# Patient Record
Sex: FEMALE | Race: WHITE | Employment: FULL TIME | URBAN - METROPOLITAN AREA
[De-identification: names, ages, dates, MRNs, and addresses within clinical notes are randomized per-mention and may not be internally consistent; named-entity substitution may affect disease eponyms.]

---

## 2017-03-22 ENCOUNTER — LABORATORY ENCOUNTER (OUTPATIENT)
Dept: LAB | Age: 52
End: 2017-03-22
Attending: INTERNAL MEDICINE
Payer: COMMERCIAL

## 2017-03-22 DIAGNOSIS — Z76.89 ENCOUNTER FOR BIOPSY: Primary | ICD-10-CM

## 2017-03-22 PROCEDURE — 88305 TISSUE EXAM BY PATHOLOGIST: CPT

## 2017-04-03 NOTE — TELEPHONE ENCOUNTER
Pharmacy takes Rx as ordered they have start dates on them. They don't make mistakes. Patient should be out of Rx anyway. Amphetamine-Dextroamphet ER (ADDERALL XR) 20 MG Oral Capsule SR 24 Hr 60 capsule 0 1/7/2017       LOV 11/16 Due in February.   Wait

## 2017-04-03 NOTE — TELEPHONE ENCOUNTER
Patient filled the generic Adderall out of order and knows they will not take the paper script she has that has . Can she get another one? She has an appointment for refills this week.     \Future Appointments  Date Time Provider Oswaldo Martínez

## 2017-04-04 NOTE — TELEPHONE ENCOUNTER
Spoke to patient she wanted this  Rx filled now. Advised that  PCP wanted her to f/u and get refill at her appointment. Insisted she need Rx before appointment.   Advised PCP again would fill at her appointment as PCP says Rx  not critical. Advised t

## 2017-04-07 NOTE — PROGRESS NOTES
Oswald Woodson IS A 46year old female [address] [facility] Patient presents with:  Medication Request: Addrall       History of present illness:     No side effects on Adderall, no change in weight, headache, palpitations. Takes it bid on work days.  Takes 1 pill on Amphetamine-Dextroamphet ER (ADDERALL XR) 20 MG Oral Capsule SR 24 Hr Take 2 capsules (40 mg total) by mouth daily.  Disp: 60 capsule Rfl: 0       Allergy:      No Known Allergies    Family history:       Family History   Problem Relation Age of Onset   • O request.    Diagnoses and all orders for this visit:    Attention deficit disorder (ADD) without hyperactivity    Screening mammogram, encounter for  -     [facility] DIGITAL MANIN TAE W/CAD (CPT=/68060);  Future    Family history of breast cancer  -     Gen

## 2017-10-30 ENCOUNTER — OFFICE VISIT (OUTPATIENT)
Dept: FAMILY MEDICINE CLINIC | Facility: CLINIC | Age: 52
End: 2017-10-30

## 2017-10-30 VITALS
WEIGHT: 159 LBS | SYSTOLIC BLOOD PRESSURE: 126 MMHG | HEIGHT: 65 IN | RESPIRATION RATE: 20 BRPM | OXYGEN SATURATION: 98 % | DIASTOLIC BLOOD PRESSURE: 84 MMHG | TEMPERATURE: 98 F | BODY MASS INDEX: 26.49 KG/M2 | HEART RATE: 76 BPM

## 2017-10-30 DIAGNOSIS — Z12.31 ENCOUNTER FOR SCREENING MAMMOGRAM FOR BREAST CANCER: Primary | ICD-10-CM

## 2017-10-30 DIAGNOSIS — F98.8 ATTENTION DEFICIT DISORDER OF ADULT: ICD-10-CM

## 2017-10-30 DIAGNOSIS — Z80.3 FAMILY HISTORY OF BREAST CANCER: ICD-10-CM

## 2017-10-30 PROCEDURE — 99213 OFFICE O/P EST LOW 20 MIN: CPT | Performed by: FAMILY MEDICINE

## 2017-10-30 RX ORDER — DEXTROAMPHETAMINE SACCHARATE, AMPHETAMINE ASPARTATE MONOHYDRATE, DEXTROAMPHETAMINE SULFATE AND AMPHETAMINE SULFATE 5; 5; 5; 5 MG/1; MG/1; MG/1; MG/1
20 CAPSULE, EXTENDED RELEASE ORAL 2 TIMES DAILY
Qty: 60 CAPSULE | Refills: 0 | Status: SHIPPED | OUTPATIENT
Start: 2017-10-30 | End: 2017-11-29

## 2017-10-30 RX ORDER — DEXTROAMPHETAMINE SACCHARATE, AMPHETAMINE ASPARTATE MONOHYDRATE, DEXTROAMPHETAMINE SULFATE AND AMPHETAMINE SULFATE 5; 5; 5; 5 MG/1; MG/1; MG/1; MG/1
20 CAPSULE, EXTENDED RELEASE ORAL 2 TIMES DAILY
Qty: 60 CAPSULE | Refills: 0 | Status: SHIPPED | OUTPATIENT
Start: 2017-12-30 | End: 2018-08-16

## 2017-10-30 RX ORDER — DEXTROAMPHETAMINE SACCHARATE, AMPHETAMINE ASPARTATE MONOHYDRATE, DEXTROAMPHETAMINE SULFATE AND AMPHETAMINE SULFATE 5; 5; 5; 5 MG/1; MG/1; MG/1; MG/1
20 CAPSULE, EXTENDED RELEASE ORAL 2 TIMES DAILY
Qty: 60 CAPSULE | Refills: 0 | Status: SHIPPED | OUTPATIENT
Start: 2017-11-30 | End: 2017-12-30

## 2017-10-30 NOTE — PATIENT INSTRUCTIONS
Continue Adderall twice a day, need to schedule recheck appointment in 3 months to reevaluate. Also do physical for non-gyne reasons at that time. Mammogram ordered. Copy to Dr. Fabian Cm.

## 2017-10-30 NOTE — PROGRESS NOTES
Cristofer Sy IS A 46year old female [facility] Patient presents with:  ADHD: refill med       History of present illness:     Pt feels her quality of life is better with than without medicine, took it bid then went off it and tried to watch lifestyle/lif tobacco: Never Used    Comment: quit    Alcohol use Yes  0.0 oz/week     Comment: OCASSIONALLY    Drug use: No    Sexual activity: Not on file     Other Topics Concern    Blood Transfusions No    Caffeine Concern Yes    Comment: coffee daily    Occupationa day, need to schedule recheck appointment in 3 months to reevaluate. Also do physical for non-gyne reasons at that time. Mammogram ordered. Copy to Dr. Saman Frost.

## 2017-11-01 ENCOUNTER — HOSPITAL ENCOUNTER (OUTPATIENT)
Dept: MAMMOGRAPHY | Age: 52
Discharge: HOME | End: 2017-11-01
Attending: FAMILY MEDICINE
Payer: COMMERCIAL

## 2017-11-01 DIAGNOSIS — Z80.3 FAMILY HISTORY OF BREAST CANCER: ICD-10-CM

## 2017-11-01 DIAGNOSIS — Z12.31 ENCOUNTER FOR SCREENING MAMMOGRAM FOR BREAST CANCER: ICD-10-CM

## 2017-11-01 PROCEDURE — 77067 SCR MAMMO BI INCL CAD: CPT | Performed by: FAMILY MEDICINE

## 2017-11-29 ENCOUNTER — OFFICE VISIT (OUTPATIENT)
Dept: OBGYN CLINIC | Facility: CLINIC | Age: 52
End: 2017-11-29

## 2017-11-29 VITALS
HEIGHT: 64 IN | SYSTOLIC BLOOD PRESSURE: 126 MMHG | BODY MASS INDEX: 26.12 KG/M2 | WEIGHT: 153 LBS | DIASTOLIC BLOOD PRESSURE: 80 MMHG

## 2017-11-29 DIAGNOSIS — Z01.419 WELL WOMAN EXAM WITH ROUTINE GYNECOLOGICAL EXAM: Primary | ICD-10-CM

## 2017-11-29 DIAGNOSIS — Z80.41 FAMILY HISTORY OF OVARIAN CANCER: ICD-10-CM

## 2017-11-29 DIAGNOSIS — Z80.0 FAMILY HISTORY OF COLON CANCER: ICD-10-CM

## 2017-11-29 DIAGNOSIS — Z12.39 BREAST CANCER SCREENING: ICD-10-CM

## 2017-11-29 DIAGNOSIS — Z12.4 CERVICAL CANCER SCREENING: ICD-10-CM

## 2017-11-29 DIAGNOSIS — Z80.3 FAMILY HISTORY OF BREAST CANCER IN SISTER: ICD-10-CM

## 2017-11-29 PROCEDURE — 99386 PREV VISIT NEW AGE 40-64: CPT | Performed by: NURSE PRACTITIONER

## 2017-11-29 PROCEDURE — 88175 CYTOPATH C/V AUTO FLUID REDO: CPT | Performed by: NURSE PRACTITIONER

## 2017-11-29 PROCEDURE — 87624 HPV HI-RISK TYP POOLED RSLT: CPT | Performed by: NURSE PRACTITIONER

## 2017-11-29 NOTE — PROGRESS NOTES
Here for new gynecology visit. 46year old G 0 P 0. No LMP recorded. Patient is not currently having periods (Reason: Menopause). .     Here for Annual Gynecologic Exam.     Patient is menopausal.    Last pap smear 11/2015 and it was normal.  No hx of abn with diarrhea or constipation. :   No urgency, frequency, NIKOLAI, bladder problems in past.  Extremities:  No pain, swelling, arthralgias, joint swelling. Neurological:  No headaches, depression, anxiety, migraines, seizure disorders, behavioral problems.

## 2017-12-12 ENCOUNTER — TELEPHONE (OUTPATIENT)
Dept: FAMILY MEDICINE CLINIC | Facility: CLINIC | Age: 52
End: 2017-12-12

## 2017-12-12 DIAGNOSIS — F98.8 ATTENTION DEFICIT DISORDER OF ADULT: ICD-10-CM

## 2017-12-12 RX ORDER — DEXTROAMPHETAMINE SACCHARATE, AMPHETAMINE ASPARTATE MONOHYDRATE, DEXTROAMPHETAMINE SULFATE AND AMPHETAMINE SULFATE 5; 5; 5; 5 MG/1; MG/1; MG/1; MG/1
20 CAPSULE, EXTENDED RELEASE ORAL 2 TIMES DAILY
Qty: 60 CAPSULE | Refills: 0 | Status: CANCELLED | OUTPATIENT
Start: 2017-12-12 | End: 2018-01-11

## 2017-12-12 NOTE — TELEPHONE ENCOUNTER
Prior auth done wait for approval or denial.    .Dispensed  D-AMPHETAMINE SALT COM ER(XR)20MG C 11/01/2017 10/30/2017   60 0 30 DILAN MASON

## 2017-12-14 NOTE — TELEPHONE ENCOUNTER
Patient says her insurance name is Jae Sanchez that is what she uses for insurance. Sent new information to insurance.

## 2018-08-16 NOTE — PATIENT INSTRUCTIONS
Alternative atomoxetine (strattera), bupropion (Wellbutrin, usually for depression but used for ADD) or less often clonidine are used for ADD. Recommend consult with Dr. Kevin Rader to determine what your best strategy for ADD is.      Will give 1 month of

## 2018-08-16 NOTE — PROGRESS NOTES
[address] IS A 48year old female HERE FOR Patient presents with:  Medication Follow-Up: Adderall refill        History of present illness:     Hasn't taken any meds since February.  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 Review of systems:     No heart symptoms, no suicidality when on medicine. Again pt states she does function and focus better when on medicines.      Exam: Affect appropriate but appears dissatisfied when I offered Strattera as an option, my other offer, which I implemented today, was an rx for Adderall 20 mg bid for just 1 month and then consult with behavioral health Dr. Desire Alcantar group for comanagement.

## 2018-08-24 ENCOUNTER — OFFICE VISIT (OUTPATIENT)
Dept: FAMILY MEDICINE CLINIC | Facility: CLINIC | Age: 53
End: 2018-08-24

## 2018-08-24 VITALS
SYSTOLIC BLOOD PRESSURE: 114 MMHG | DIASTOLIC BLOOD PRESSURE: 74 MMHG | TEMPERATURE: 98 F | BODY MASS INDEX: 27.63 KG/M2 | HEART RATE: 64 BPM | WEIGHT: 161.81 LBS | RESPIRATION RATE: 16 BRPM | HEIGHT: 64 IN
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
## 2018-08-24 PROCEDURE — 99212 OFFICE O/P EST SF 10 MIN: CPT | Performed by: FAMILY MEDICINE

## 2018-08-24 NOTE — PROGRESS NOTES
[address] IS A 48year old female [address] [place] [address] Patient presents with:  Bite: Pt. believes she was bit by a bug when she was walking through the [place] yesterday.  Pt. states its painful to the touch and is bruised       History of present illness: on, elliptical    Seat Belt Yes     Social History Narrative    Has 3 cats. Review of systems:     Had some random aches in back this AM, later right axilla area, went away. and had minor itching at the site. Was wearing pants.      Exam:     /

## 2018-08-24 NOTE — PATIENT INSTRUCTIONS
Ice, elevate leg, rest (avoid long walks until Sunday) tylenol (avoid ibuprofen or Aleve for 2 days or so). Return for expanding red raised skin lesion, fever, flu like symptoms with aches.

## 2020-01-21 ENCOUNTER — TELEPHONE (OUTPATIENT)
Dept: FAMILY MEDICINE CLINIC | Facility: CLINIC | Age: 55
End: 2020-01-21

## 2020-01-22 ENCOUNTER — TELEPHONE (OUTPATIENT)
Dept: FAMILY MEDICINE CLINIC | Facility: CLINIC | Age: 55
End: 2020-01-22

## 2020-02-05 ENCOUNTER — OFFICE VISIT (OUTPATIENT)
Dept: FAMILY MEDICINE CLINIC | Facility: CLINIC | Age: 55
End: 2020-02-05

## 2020-02-05 ENCOUNTER — HOSPITAL ENCOUNTER (OUTPATIENT)
Dept: MAMMOGRAPHY | Age: 55
Discharge: HOME | End: 2020-02-05
Attending: FAMILY MEDICINE
Payer: COMMERCIAL

## 2020-02-05 VITALS
DIASTOLIC BLOOD PRESSURE: 80 MMHG | HEART RATE: 66 BPM | SYSTOLIC BLOOD PRESSURE: 110 MMHG | OXYGEN SATURATION: 98 % | RESPIRATION RATE: 16 BRPM | HEIGHT: 64 IN | TEMPERATURE: 98 F | BODY MASS INDEX: 29.19 KG/M2 | WEIGHT: 171 LBS

## 2020-02-05 DIAGNOSIS — Z00.00 WELL ADULT EXAM: ICD-10-CM

## 2020-02-05 DIAGNOSIS — Z13.228 SCREENING FOR ENDOCRINE, NUTRITIONAL, METABOLIC AND IMMUNITY DISORDER: ICD-10-CM

## 2020-02-05 DIAGNOSIS — Z80.41 FAMILY HISTORY OF OVARIAN CANCER: ICD-10-CM

## 2020-02-05 DIAGNOSIS — Z86.010 PERSONAL HISTORY OF COLONIC POLYPS: ICD-10-CM

## 2020-02-05 DIAGNOSIS — Z12.31 VISIT FOR SCREENING MAMMOGRAM: ICD-10-CM

## 2020-02-05 DIAGNOSIS — Z80.3 FAMILY HISTORY OF BREAST CANCER: ICD-10-CM

## 2020-02-05 DIAGNOSIS — Z13.21 SCREENING FOR ENDOCRINE, NUTRITIONAL, METABOLIC AND IMMUNITY DISORDER: ICD-10-CM

## 2020-02-05 DIAGNOSIS — Z78.0 POSTMENOPAUSAL: Primary | ICD-10-CM

## 2020-02-05 DIAGNOSIS — Z13.29 SCREENING FOR ENDOCRINE, NUTRITIONAL, METABOLIC AND IMMUNITY DISORDER: ICD-10-CM

## 2020-02-05 DIAGNOSIS — Z13.29 SCREENING FOR THYROID DISORDER: ICD-10-CM

## 2020-02-05 DIAGNOSIS — Z13.1 SCREENING FOR DIABETES MELLITUS: ICD-10-CM

## 2020-02-05 DIAGNOSIS — Z13.0 SCREENING FOR DEFICIENCY ANEMIA: ICD-10-CM

## 2020-02-05 DIAGNOSIS — Z13.0 SCREENING FOR ENDOCRINE, NUTRITIONAL, METABOLIC AND IMMUNITY DISORDER: ICD-10-CM

## 2020-02-05 DIAGNOSIS — Z13.220 SCREENING, LIPID: ICD-10-CM

## 2020-02-05 DIAGNOSIS — Z11.59 ENCOUNTER FOR HCV SCREENING TEST FOR LOW RISK PATIENT: ICD-10-CM

## 2020-02-05 PROCEDURE — 77063 BREAST TOMOSYNTHESIS BI: CPT | Performed by: FAMILY MEDICINE

## 2020-02-05 PROCEDURE — 99396 PREV VISIT EST AGE 40-64: CPT | Performed by: FAMILY MEDICINE

## 2020-02-05 PROCEDURE — 90472 IMMUNIZATION ADMIN EACH ADD: CPT | Performed by: FAMILY MEDICINE

## 2020-02-05 PROCEDURE — 90750 HZV VACC RECOMBINANT IM: CPT | Performed by: FAMILY MEDICINE

## 2020-02-05 PROCEDURE — 90714 TD VACC NO PRESV 7 YRS+ IM: CPT | Performed by: FAMILY MEDICINE

## 2020-02-05 PROCEDURE — 90471 IMMUNIZATION ADMIN: CPT | Performed by: FAMILY MEDICINE

## 2020-02-05 PROCEDURE — 77067 SCR MAMMO BI INCL CAD: CPT | Performed by: FAMILY MEDICINE

## 2020-02-05 NOTE — PROGRESS NOTES
Chris Graff is a 47year old female here for Patient presents with: Well Adult: Denied flu shot. HPI:   Patient complains of here for well exam.       Sees gyne, LMP 5 years ago. Sister had age 44 breast ca, mother ovarian ca in 35s. on file        Gets together: Not on file        Attends [employer] service: Not on file        Active member of club or organization: Not on file        Attends meetings of clubs or organizations: Not on file        Relationship status: Not on file      In or discharge, no axillary adenopathy. LUNGS: clear to auscultation. CARDIO: Regular rate and rhythm without murmur S3 or S4.  GI: no distention, masses, organomegaly or tenderness.   : defer  MUSCULOSKELETAL: Back and extremities within normal limits Case: [id number]                                  Authorizing Provider:  WINTER Calixto     Collected:           11/29/2017 02:49 PM          Ordering Location:     [place] [address],      Received:            11/29/2017 09:10 PM notify of results    Patient Instructions     Check with sister if she had genetic testing for breast and ovarian cancer risk. Td since last tetanus was 12 years.  Over 50, recommend Shingrix, shingles vaccine 2 doses 2 to 6 months apart, 98% effective find possible disorders or diseases in people who don't have any symptoms.  The goal is to find a disease early so lifestyle changes can be made and you can be watched more closely to reduce the risk of disease, or to detect it early enough to treat it most double-contrast barium enema every 5 years; yearly fecal occult blood test or fecal immunochemical test; or a stool DNA test as often as your health care provider advises; talk with your health care provider about which tests are best for you   Depression 3 doses   Influenza (flu) All women in this age group Once a year   Measles, mumps, rubella (MMR) Women in this age group through their late 46s who have no record of these infections or vaccines 1 dose   Meningococcal Women at increased risk for infection instructions. Recommend return for annual adult well exam in 1 year.     Td     Kelly

## 2020-02-05 NOTE — PATIENT INSTRUCTIONS
Check with sister if she had genetic testing for breast and ovarian cancer risk. Td since last tetanus was 12 years.  Over 50, recommend Shingrix, shingles vaccine 2 doses 2 to 6 months apart, 98% effective but there is about 16% risk of arm swelling an don't have any symptoms. The goal is to find a disease early so lifestyle changes can be made and you can be watched more closely to reduce the risk of disease, or to detect it early enough to treat it most effectively.  Screening tests are not considered d fecal occult blood test or fecal immunochemical test; or a stool DNA test as often as your health care provider advises; talk with your health care provider about which tests are best for you   Depression All women in this age group At routine exams   [facility] group Once a year   Measles, mumps, rubella (MMR) Women in this age group through their late 46s who have no record of these infections or vaccines 1 dose   Meningococcal Women at increased risk for infection – talk with your healthcare provider 1 or more adult well exam in 1 year.     Td     Shingrix

## 2021-02-23 ENCOUNTER — TELEPHONE (OUTPATIENT)
Dept: FAMILY MEDICINE CLINIC | Facility: CLINIC | Age: 56
End: 2021-02-23

## 2021-02-23 DIAGNOSIS — Z12.31 ENCOUNTER FOR SCREENING MAMMOGRAM FOR BREAST CANCER: ICD-10-CM

## 2021-02-23 DIAGNOSIS — Z20.822 CLOSE EXPOSURE TO COVID-19 VIRUS: Primary | ICD-10-CM

## 2021-02-23 NOTE — TELEPHONE ENCOUNTER
Called patient and reviewed exposure. Was at the hair salon for 2 hrs. States she was wearing her mask except when drinking coffee. She is unsure if all other employees and clients were wearing masks properly. Denies any symptoms.  Advised since exposure

## 2021-02-23 NOTE — TELEPHONE ENCOUNTER
Patient stated that yesterday she was at the salon getting hair done. Today her stylist called her stating the entire salon has tested positive for covid. Patient stated that everyone was wearing masks. Patient would like to know protocol for her.

## 2021-03-01 ENCOUNTER — LAB ENCOUNTER (OUTPATIENT)
Dept: LAB | Age: 56
End: 2021-03-01
Attending: FAMILY MEDICINE
Payer: COMMERCIAL

## 2021-03-01 DIAGNOSIS — Z20.822 CLOSE EXPOSURE TO COVID-19 VIRUS: ICD-10-CM

## 2021-03-03 LAB — SARS-COV-2 RNA RESP QL NAA+PROBE: NOT DETECTED

## 2021-03-03 NOTE — TELEPHONE ENCOUNTER
Patient scheduled an appt on [facility] for a follow up visit to get a RTW letter. Had covid exposure  2/22/21 and negative covid test 3/1/21. Self isolation could end in no ongoing symptoms on 3/8/21.   ML for patient to check to see if she developed any

## 2021-03-08 ENCOUNTER — TELEMEDICINE (OUTPATIENT)
Dept: FAMILY MEDICINE CLINIC | Facility: CLINIC | Age: 56
End: 2021-03-08

## 2021-03-08 VITALS — WEIGHT: 171 LBS | BODY MASS INDEX: 29 KG/M2

## 2021-03-08 DIAGNOSIS — R51.9 ACUTE NONINTRACTABLE HEADACHE, UNSPECIFIED HEADACHE TYPE: ICD-10-CM

## 2021-03-08 DIAGNOSIS — R53.83 OTHER FATIGUE: Primary | ICD-10-CM

## 2021-03-08 PROBLEM — Z20.822 CLOSE EXPOSURE TO COVID-19 VIRUS: Status: ACTIVE | Noted: 2021-03-08

## 2021-03-08 PROCEDURE — 99213 OFFICE O/P EST LOW 20 MIN: CPT | Performed by: FAMILY MEDICINE

## 2021-03-08 NOTE — PROGRESS NOTES
[address] A 54year old female evaluated via telehealth visit FOR Patient presents with:  Nausea/vomiting  Fatigue  Covid: F/U.    due to current national emergency with SARS-CoV-2 pandemic.      History of present illness:   2-way communic feel not well. no fever. No taste or smell problems. Occasional body aches, not as severe as with flu. Seun Drake Appetite was normal until today. Talked with HR and was told to return to work right away after her negative covid test a week ago.  She is concerned Exercise: Yes          off and on, elliptical        Bike Helmet: Not Asked        Seat Belt: Yes        Self-Exams: Not Asked    Social History Narrative      Has 3 cats.      Social Determinants of Health  Financial Resource Strain:       Difficulty from work until results are back and ideally until you have recovered from symptoms. Continue isolation as you have been.

## 2021-03-08 NOTE — PATIENT INSTRUCTIONS
Call [phone] to schedule repeat covid test at the place and time they assign you. Note written to excuse from work until results are back and ideally until you have recovered from symptoms. Continue isolation as you have been.    Coronavirus Disea protection. You may be advised to wait in or enter through a separate area. This is to prevent the possible virus from spreading. · Tell the healthcare staff about recent travel. This includes local travel on public transport.  Staff may need to find other home   The FDA has approved a vaccine to prevent COVID-19 in people 16 years and older who are not pregnant or breastfeeding. It's not currently available to the entire public.  The first phase of vaccine roll-out will go to healthcare staff and residents o COVID-19 convalescent plasma donation. Plasma from people fully recovered from COVID-19 may contain antibodies to help fight COVID-19 in people who are currently seriously ill with the disease.  Experts don't know the safety of COVID-19 convalescent plasma your contact with others, call your provider, and monitor for symptoms. If you are normally healthy, the CDC does not advise retesting for COVID-19 with nose-throat swabs. You can stop self-isolation when all 3 of these are true:  1.  You have had no fever widely used by people who are out in the public.     Certain people should not wear a face covering.  This includes:  · Children younger than 3years old  · Anyone with a health, developmental, or mental health condition that can be made worse by wearing a

## 2021-03-10 ENCOUNTER — LAB ENCOUNTER (OUTPATIENT)
Dept: LAB | Age: 56
End: 2021-03-10
Attending: FAMILY MEDICINE
Payer: COMMERCIAL

## 2021-03-10 DIAGNOSIS — R53.83 OTHER FATIGUE: ICD-10-CM

## 2021-03-10 DIAGNOSIS — R51.9 ACUTE NONINTRACTABLE HEADACHE, UNSPECIFIED HEADACHE TYPE: ICD-10-CM

## 2021-03-11 LAB — SARS-COV-2 RNA RESP QL NAA+PROBE: NOT DETECTED

## 2021-03-15 ENCOUNTER — TELEPHONE (OUTPATIENT)
Dept: FAMILY MEDICINE CLINIC | Facility: CLINIC | Age: 56
End: 2021-03-15

## 2021-03-15 NOTE — TELEPHONE ENCOUNTER
Called and spoke to patient. Pt states she needs a return to work date of 3/17 (she is off 3/15/ & 3/16). Pt states she is feeling much better and is ready to return to work. Denies any fever or cough. Stated pain in her arm has subsided.  States she only h

## 2021-03-15 NOTE — TELEPHONE ENCOUNTER
Second COVID test was negative. Needs a note to return to work for Wednesday 3/17/2021. Please notify patient when available in [address]

## 2021-04-26 ENCOUNTER — TELEPHONE (OUTPATIENT)
Dept: FAMILY MEDICINE CLINIC | Facility: CLINIC | Age: 56
End: 2021-04-26

## 2021-05-28 ENCOUNTER — PATIENT OUTREACH (OUTPATIENT)
Dept: FAMILY MEDICINE CLINIC | Facility: CLINIC | Age: 56
End: 2021-05-28

## 2021-12-31 ENCOUNTER — HOSPITAL ENCOUNTER (EMERGENCY)
Facility: HOSPITAL | Age: 56
Discharge: ANOTHER HOSPITAL | End: 2021-12-31
Attending: EMERGENCY MEDICINE

## 2021-12-31 VITALS
HEIGHT: 64 IN | RESPIRATION RATE: 18 BRPM | OXYGEN SATURATION: 98 % | WEIGHT: 170 LBS | SYSTOLIC BLOOD PRESSURE: 131 MMHG | HEART RATE: 66 BPM | BODY MASS INDEX: 29.02 KG/M2 | DIASTOLIC BLOOD PRESSURE: 95 MMHG | TEMPERATURE: 97 F

## 2021-12-31 DIAGNOSIS — H33.22 LEFT RETINAL DETACHMENT: Primary | ICD-10-CM

## 2021-12-31 PROCEDURE — 99285 EMERGENCY DEPT VISIT HI MDM: CPT

## 2021-12-31 RX ORDER — ONDANSETRON 2 MG/ML
4 INJECTION INTRAMUSCULAR; INTRAVENOUS ONCE
Status: DISCONTINUED | OUTPATIENT
Start: 2021-12-31 | End: 2021-12-31

## 2021-12-31 NOTE — ED QUICK NOTES
Report called to the eye clinic. Patient will be going by car per her . Eye clinic states there is a no visitor policy.

## 2021-12-31 NOTE — ED QUICK NOTES
Call to [facility] regarding transfer. Call to [phone]. Requesting face sheet be faxed to [phone] and will re contact ER.

## 2021-12-31 NOTE — ED PROVIDER NOTES
Patient Seen in: [facility] Emergency Department      History   Patient presents with:   Eye Visual Problem    Stated Complaint: pt dx with L sided superior retinal detachment, ophthamologist instructed pt to*    Subjective:   HPI    Patient is a fela stated age resting comfortably  HEENT: Normocephalic atraumatic. Nonicteric sclera. Moist mucous membranes. Both pupils reactive. See visual acuity  Lungs: No tachypnea  Cardiac: No tachycardia  Skin: No rashes, pallor  Neuro: No focal deficits.   Extre

## 2021-12-31 NOTE — ED INITIAL ASSESSMENT (HPI)
Pt to ED for c/o left-sided superior retinal detachment - diagnosed yesterday at 1700 by Ophthalmologist and was told to come to the ED. Pt decided to come this AM. Pt states  She's been having diminished vision on her left eye. Denies pain.

## (undated) NOTE — LETTER
07/07/21        [address] [facility] [address]      Dear Caryle Shuck records indicate that you have outstanding lab work and or testing that was ordered for you and has not yet been completed:  Orders Placed This Encounter

## (undated) NOTE — LETTER
04/02/21        [address] [facility] [place] [address]      Dear Mike Lewis records indicate that you have outstanding lab work and or testing that was ordered for you and has not yet been completed:  Orders Placed This Encounter

## (undated) NOTE — LETTER
Patient Name: Scooby Orona  : 3/16/1965  MRN: [id number]  Patient Address: [place] [address]      Coronavirus Disease 2019 (COVID-19)     [facility] is committed to the safety and well-being of our patients, linda 2. Monitor your symptoms carefully. If your symptoms get worse, call your healthcare provider immediately. 3. Get rest and stay hydrated.    4. If you have a medical appointment, call the healthcare provider ahead of time and tell them that you have or may ? At least 24 hours have passed since recovery defined as resolution of fever without the use of fever-reducing medications; and  · Improvement in respiratory symptoms (e.g., cough, shortness of breath); and  · At least 10 days have passed since symptoms f If you would be interested in donating your plasma to help treat others diagnosed with the virus, please contact [facility] directly on their website: [email or web address]    Who is eligible to donate convalescent plasma?

## (undated) NOTE — MR AVS SNAPSHOT
Dwight Pinto [address] [place] [id number] [phone]               Thank you for choosing us for your health care visit with Leslee Sparrow MD.  We are glad to serve you and happy to provide you with this clinic staff will provide you with the phone number you should call to schedule your appointment.      If you are confident that your benefit plan will not require a referral or authorization, such as [place], please feel free to schedule your marisol - when to take this   Commonly known as:  ADDERALL XR           * Amphetamine-Dextroamphet ER 20 MG Cp24   Take 1 capsule (20 mg total) by mouth 2 (two) times daily.    What changed:    - how much to take  - when to take this   Commonly known as:  ADDERALL Water is best for hydration Fast food. Eat at home when possible     Tips for increasing your physical activity – Adults who are physically active are less likely to develop some chronic diseases than adults who are inactive.      HOW TO GET STARTED: HOW

## (undated) NOTE — LETTER
03/06/20        [address] [facility] [address]      Dear Chiqui Flores records indicate that you have outstanding lab work and or testing that was ordered for you and has not yet been completed:  Orders Placed This Encounter

## (undated) NOTE — LETTER
Date: 3/15/2021    Patient Name: Avril Rosas          To Whom it may concern:    Ms. Estepahnia Mahajan has now recovered from her illness symptoms. Her second Covid test was negative on March 10.     This patient should be excused from attending [facility]